# Patient Record
(demographics unavailable — no encounter records)

---

## 2025-07-10 NOTE — DISCUSSION/SUMMARY
[de-identified] : Very pleasant gentleman with known history of cervical spondylosis presents for worsening neck pain primarily on his left-hand side.  With this complaint he responded well to a trigger point injection course of physical therapy is going to be initiated as well as initiation of a Medrol Dosepak and naproxen sodium.  Patient be followed up in approximately 3 to 4 weeks if signs and symptoms are still persistent I would recommend a subacromial injection of the left shoulder given his physical exam of rotator cuff internal derangement type characteristics and probably a new/repeat cervical MRI will be ordered.

## 2025-07-10 NOTE — PHYSICAL EXAM
[Antalgic] : antalgic [de-identified] : CONSTITUTIONAL:  Patient is a very pleasant individual who is well-nourished and appears stated age.  PSYCHIATRIC:  Alert and oriented times three and in no apparent distress, and participates with orthopedic evaluation well. HEAD:  Atraumatic and  nonsyndromic in appearance. EENT: No thyromegaly, EOMI. RESPIRATORY:  Respiratory rate is regular, not dyspneic on examination. LYMPHATICS:  There is no cervical or axillary lymphadenopathy. INTEGUMENTARY:  Skin is clean, dry, and intact about the bilateral upper extremities and cervical spine.  VASCULAR:   There is brisk capillary refill about the bilateral upper extremities and radial pulses are 2/4.  NEUROLOGIC:  Negative L'hirmitte, negative Spurling's sign. There are no pathologic reflexes. There is no decrease in sensation of the bilateral upper extremities on manual examination.  Deep tendon reflexes are well-maintained at +2/4 of the bilateral upper extremities and are symmetric. MUSCULOSKELETAL:  There is no visible muscular atrophy.  Manual motor strength is well maintained in the bilateral upper extremities.  Cervical range of motion is well maintained.  The patient ambulates in a non-myelopathic manner. Normal secondary orthopaedic exam of bilateral elbows and hands.  Elbow flexion and extension, wrist extension, finger flexion and abduction are well maintained. Significant left trapezial trigger point. Bilateral shoulders are demonstrating internal derangement/rotator cuff tendinopathy bilaterally left greater than the right.   [de-identified] : X-rays of the cervical spine show loss of cervical lordosis appears to be an autofusion at C5-C6 cervical spondylosis at C4-C5.  2 views of the cervical spine ordered and reviewed on today's date of July 10, 2025.  Cervical MRI from 2022 has been reviewed showing cervical spondylosis with no severe central stenosis at that point in time.

## 2025-07-10 NOTE — HISTORY OF PRESENT ILLNESS
[de-identified] : Very pleasant 67-year-old gentleman presents with worsening cervical pain and left shoulder pain.  He has been managed by his primary care physician who started him on tramadol and methocarbamol.  He states this is helpful for pain but as soon as he stops taking the medication it gradually returns.  Patient presents for orthopedic evaluation secondary to this worsening pain and discomfort thankfully his BOBBY questionnaire is negative.  He states his pain is worse at night and isolated over his left trap and left shoulder. [Worsening] : worsening [___ wks] : [unfilled] week(s) ago [6] : a current pain level of 6/10 [4] : an average pain level of 4/10 [1] : a minimum pain level of 1/10 [10] : a maximum pain level of 10/10 [Constant] : ~He/She~ states the symptoms seem to be constant [Bending] : worsened by bending [Lifting] : worsened by lifting [Weight Bearing] : worsened by weight bearing [Heat] : relieved by heat [Ataxia] : no ataxia [Incontinence] : no incontinence [Loss of Dexterity] : good dexterity [Urinary Ret.] : no urinary retention [de-identified] : Methocarbamol and tramadol